# Patient Record
Sex: FEMALE | Race: WHITE | NOT HISPANIC OR LATINO | Employment: FULL TIME | URBAN - METROPOLITAN AREA
[De-identification: names, ages, dates, MRNs, and addresses within clinical notes are randomized per-mention and may not be internally consistent; named-entity substitution may affect disease eponyms.]

---

## 2020-07-03 NOTE — PROGRESS NOTES
Lalitha Barreto is a 49 y.o. female  who presents for a well woman exam.  Notes mild hot flashes and low libido.     Past Medical History:   Diagnosis Date    Migraines        Past Surgical History:   Procedure Laterality Date    ABLATION      BILATERAL TUBAL LIGATION       SECTION      x2    DILATION AND CURETTAGE OF UTERUS      HYSTEROSCOPY WITH DILATION AND CURETTAGE OF UTERUS  2014    with Novasure endometrial ablation    LAPAROSCOPY      TONSILLECTOMY AND ADENOIDECTOMY      WRIST SURGERY Left        OB History    Para Term  AB Living   4 4           SAB TAB Ectopic Multiple Live Births                  # Outcome Date GA Lbr Hussain/2nd Weight Sex Delivery Anes PTL Lv   4 Para            3 Para            2 Para            1 Para                Family History   Problem Relation Age of Onset    Hypertension Father     Hyperlipidemia Father     Hyperlipidemia Mother        Social History     Tobacco Use    Smoking status: Never Smoker    Smokeless tobacco: Never Used   Substance Use Topics    Alcohol use: Not Currently    Drug use: Never         Current Outpatient Medications:     fluticasone propionate (FLONASE) 50 mcg/actuation nasal spray, Flonase Allergy Relief 50 mcg/actuation nasal spray,suspension  Spray 1 spray every day by intranasal route., Disp: , Rfl:     MULTIVITAMIN ORAL, multivitamin  1 po daily, Disp: , Rfl:      Review of patient's allergies indicates:  No Known Allergies     ROS:  GENERAL: Denies weight gain or weight loss. Feeling well overall.   SKIN: Denies rash or lesions.   HEAD: Denies head injury or headache.   NODES: Denies enlarged lymph nodes.   CHEST: Denies shortness of breath.   CARDIOVASCULAR: Denies palpitations or chest pain.   ABDOMEN: Denies abdominal pain, constipation, diarrhea, nausea, vomiting or rectal bleeding.   URINARY: Denies frequency, dysuria, hematuria, or burning on urination.  REPRODUCTIVE: See HPI.   BREASTS: Denies pain,  "lumps, or nipple discharge.   HEMATOLOGIC: Denies easy bruisability or excessive bleeding.  MUSCULOSKELETAL: Denies joint pain or swelling.   NEUROLOGIC: Denies syncope or weakness.   PSYCHIATRIC: Denies depression, anxiety or mood swings.    PHYSICAL EXAM:    /75 (BP Location: Right arm, Patient Position: Sitting, BP Method: Medium (Automatic))   Pulse 68   Ht 5' 3" (1.6 m)   Wt 84.7 kg (186 lb 12.8 oz)   BMI 33.09 kg/m²    Body mass index is 33.09 kg/m².     APPEARANCE: Well nourished, well developed, in no acute distress.  AFFECT: WNL, alert and oriented x 3  SKIN: No acne or hirsutism  NECK: Neck symmetric without masses or thyromegaly  NODES: No inguinal, cervical, axillary, or femoral lymph node enlargement  CHEST: Good respiratory effect  ABDOMEN: Soft.  No tenderness or masses.  No hepatosplenomegaly.  No hernias.  BREASTS: Symmetrical, no skin changes or visible lesions.  No palpable masses, nipple discharge bilaterally.  PELVIC: Normal external genitalia without lesions.  Normal hair distribution.  Adequate perineal body, normal urethral meatus.  Urethra and bladder without tenderness or masses. Vagina moist and well rugated without lesions or discharge.  Cervix pink, without lesions, discharge or tenderness.  No significant cystocele or rectocele.  Bimanual exam shows uterus to be normal size, regular, mobile and nontender.  Adnexa without masses or tenderness.    EXTREMITIES: No edema.     Well woman exam with routine gynecological exam  -     Liquid-based pap smear, screening    Screening mammogram, encounter for  -     Mammo Digital Screening Bilat w/ Allen; Future    Screening for osteoporosis  -     DXA Bone Density Spine And Hip; Future       Referral for colonoscopy screening  Patient was counseled today on A.C.S. Pap guidelines and recommendations for yearly pelvic exams, mammograms and monthly self breast exams; to see her PCP for other health maintenance.   Trial of testosterone cream " from [facility].  Follow up in 1 year

## 2020-07-06 ENCOUNTER — OFFICE VISIT (OUTPATIENT)
Dept: OBSTETRICS AND GYNECOLOGY | Facility: CLINIC | Age: 50
End: 2020-07-06
Payer: COMMERCIAL

## 2020-07-06 VITALS
BODY MASS INDEX: 33.1 KG/M2 | DIASTOLIC BLOOD PRESSURE: 75 MMHG | SYSTOLIC BLOOD PRESSURE: 118 MMHG | HEIGHT: 63 IN | WEIGHT: 186.81 LBS | HEART RATE: 68 BPM

## 2020-07-06 DIAGNOSIS — Z01.419 WELL WOMAN EXAM WITH ROUTINE GYNECOLOGICAL EXAM: Primary | ICD-10-CM

## 2020-07-06 DIAGNOSIS — Z13.820 SCREENING FOR OSTEOPOROSIS: ICD-10-CM

## 2020-07-06 DIAGNOSIS — Z12.31 SCREENING MAMMOGRAM, ENCOUNTER FOR: ICD-10-CM

## 2020-07-06 PROCEDURE — 99396 PR PREVENTIVE VISIT,EST,40-64: ICD-10-PCS | Mod: S$GLB,,, | Performed by: OBSTETRICS & GYNECOLOGY

## 2020-07-06 PROCEDURE — 99396 PREV VISIT EST AGE 40-64: CPT | Mod: S$GLB,,, | Performed by: OBSTETRICS & GYNECOLOGY

## 2020-07-06 RX ORDER — FLUTICASONE PROPIONATE 50 MCG
SPRAY, SUSPENSION (ML) NASAL
COMMUNITY
End: 2022-10-07

## 2020-07-08 ENCOUNTER — TELEPHONE (OUTPATIENT)
Dept: OBSTETRICS AND GYNECOLOGY | Facility: CLINIC | Age: 50
End: 2020-07-08

## 2020-09-21 ENCOUNTER — TELEPHONE (OUTPATIENT)
Dept: OBSTETRICS AND GYNECOLOGY | Facility: CLINIC | Age: 50
End: 2020-09-21

## 2020-09-21 DIAGNOSIS — M85.851 OSTEOPENIA OF BOTH HIPS: Primary | ICD-10-CM

## 2020-09-21 DIAGNOSIS — M85.852 OSTEOPENIA OF BOTH HIPS: Primary | ICD-10-CM

## 2020-09-21 NOTE — TELEPHONE ENCOUNTER
----- Message from Mleissa Sanchez MD sent at 9/21/2020  9:04 AM CDT -----  Please notify pt of osteopenia of the hips but the spine is normal. Please have pt have Vit D3 level checked and take calcium with Vit D BID. Order entered

## 2020-09-21 NOTE — TELEPHONE ENCOUNTER
Returning jaspreet call for appt on friday   Spoke with patient. Two pt identifiers confirmed. Notified patient of results. Patient verbalized understanding.

## 2020-09-21 NOTE — TELEPHONE ENCOUNTER
Spoke with patient. Two pt identifiers confirmed. Notified patient of results. Patient verbalized understanding. Pt will come by and pickup lab orders.

## 2020-09-21 NOTE — TELEPHONE ENCOUNTER
----- Message from Melissa Sanchez MD sent at 9/21/2020  1:39 PM CDT -----  Please notify patient of normal result.

## 2020-09-21 NOTE — PROGRESS NOTES
Please notify pt of osteopenia of the hips but the spine is normal. Please have pt have Vit D3 level checked and take calcium with Vit D BID. Order entered

## 2021-07-01 ENCOUNTER — PATIENT MESSAGE (OUTPATIENT)
Dept: ADMINISTRATIVE | Facility: OTHER | Age: 51
End: 2021-07-01

## 2021-07-07 ENCOUNTER — OFFICE VISIT (OUTPATIENT)
Dept: OBSTETRICS AND GYNECOLOGY | Facility: CLINIC | Age: 51
End: 2021-07-07
Payer: COMMERCIAL

## 2021-07-07 VITALS
DIASTOLIC BLOOD PRESSURE: 81 MMHG | BODY MASS INDEX: 31.07 KG/M2 | SYSTOLIC BLOOD PRESSURE: 127 MMHG | WEIGHT: 175.38 LBS

## 2021-07-07 DIAGNOSIS — Z12.31 SCREENING MAMMOGRAM, ENCOUNTER FOR: ICD-10-CM

## 2021-07-07 DIAGNOSIS — Z01.419 WELL WOMAN EXAM WITH ROUTINE GYNECOLOGICAL EXAM: Primary | ICD-10-CM

## 2021-07-07 PROCEDURE — 3008F PR BODY MASS INDEX (BMI) DOCUMENTED: ICD-10-PCS | Mod: CPTII,S$GLB,, | Performed by: OBSTETRICS & GYNECOLOGY

## 2021-07-07 PROCEDURE — 3008F BODY MASS INDEX DOCD: CPT | Mod: CPTII,S$GLB,, | Performed by: OBSTETRICS & GYNECOLOGY

## 2021-07-07 PROCEDURE — 99396 PR PREVENTIVE VISIT,EST,40-64: ICD-10-PCS | Mod: S$GLB,,, | Performed by: OBSTETRICS & GYNECOLOGY

## 2021-07-07 PROCEDURE — 99396 PREV VISIT EST AGE 40-64: CPT | Mod: S$GLB,,, | Performed by: OBSTETRICS & GYNECOLOGY

## 2021-12-20 ENCOUNTER — TELEPHONE (OUTPATIENT)
Dept: OBSTETRICS AND GYNECOLOGY | Facility: CLINIC | Age: 51
End: 2021-12-20
Payer: COMMERCIAL

## 2021-12-20 DIAGNOSIS — N30.90 CYSTITIS: Primary | ICD-10-CM

## 2021-12-20 RX ORDER — SULFAMETHOXAZOLE AND TRIMETHOPRIM 800; 160 MG/1; MG/1
1 TABLET ORAL 2 TIMES DAILY
Qty: 14 TABLET | Refills: 0 | Status: SHIPPED | OUTPATIENT
Start: 2021-12-20 | End: 2022-10-07

## 2021-12-20 RX ORDER — PHENAZOPYRIDINE HYDROCHLORIDE 200 MG/1
200 TABLET, FILM COATED ORAL 3 TIMES DAILY PRN
Qty: 6 TABLET | Refills: 0 | Status: SHIPPED | OUTPATIENT
Start: 2021-12-20 | End: 2022-10-07

## 2022-07-20 ENCOUNTER — TELEPHONE (OUTPATIENT)
Dept: OBSTETRICS AND GYNECOLOGY | Facility: CLINIC | Age: 52
End: 2022-07-20
Payer: COMMERCIAL

## 2022-07-20 NOTE — TELEPHONE ENCOUNTER
----- Message from Sadia Webb sent at 7/20/2022 10:40 AM CDT -----  Contact: self  Requesting a call back regarding rescheduling her last appointment she had to cancel - system showing next July is first available... Please call back at [phone]

## 2022-10-06 ENCOUNTER — PATIENT MESSAGE (OUTPATIENT)
Dept: OBSTETRICS AND GYNECOLOGY | Facility: CLINIC | Age: 52
End: 2022-10-06
Payer: COMMERCIAL

## 2022-10-07 ENCOUNTER — OFFICE VISIT (OUTPATIENT)
Dept: OBSTETRICS AND GYNECOLOGY | Facility: CLINIC | Age: 52
End: 2022-10-07
Payer: COMMERCIAL

## 2022-10-07 VITALS
BODY MASS INDEX: 32.54 KG/M2 | DIASTOLIC BLOOD PRESSURE: 70 MMHG | HEIGHT: 63 IN | WEIGHT: 183.63 LBS | SYSTOLIC BLOOD PRESSURE: 116 MMHG | HEART RATE: 70 BPM

## 2022-10-07 DIAGNOSIS — Z12.31 SCREENING MAMMOGRAM, ENCOUNTER FOR: ICD-10-CM

## 2022-10-07 DIAGNOSIS — Z13.820 SCREENING FOR OSTEOPOROSIS: ICD-10-CM

## 2022-10-07 DIAGNOSIS — Z01.419 WELL WOMAN EXAM WITH ROUTINE GYNECOLOGICAL EXAM: Primary | ICD-10-CM

## 2022-10-07 PROCEDURE — 99396 PREV VISIT EST AGE 40-64: CPT | Mod: S$GLB,,, | Performed by: OBSTETRICS & GYNECOLOGY

## 2022-10-07 PROCEDURE — 99396 PR PREVENTIVE VISIT,EST,40-64: ICD-10-PCS | Mod: S$GLB,,, | Performed by: OBSTETRICS & GYNECOLOGY

## 2022-10-07 RX ORDER — FLUTICASONE PROPIONATE 50 MCG
SPRAY, SUSPENSION (ML) NASAL
COMMUNITY

## 2022-10-07 NOTE — PROGRESS NOTES
Lalitha Barreto is a 52 y.o. female  who presents for a well woman exam.  She has no issues, problems, or complaints.    Past Medical History:   Diagnosis Date    Colon cancer screening     Cologuard negative    Migraines        Past Surgical History:   Procedure Laterality Date    BILATERAL TUBAL LIGATION       SECTION      x2    DILATION AND CURETTAGE OF UTERUS      HYSTEROSCOPY WITH DILATION AND CURETTAGE OF UTERUS  2014    with Novasure endometrial ablation    LAPAROSCOPY      TONSILLECTOMY AND ADENOIDECTOMY      WRIST SURGERY Left        OB History    Para Term  AB Living   5 4     1 3   SAB IAB Ectopic Multiple Live Births           3      # Outcome Date GA Lbr Hussain/2nd Weight Sex Delivery Anes PTL Lv   5 AB            4 Para            3 Para            2 Para            1 Para                Family History   Problem Relation Age of Onset    Hypertension Father     Hyperlipidemia Father     Hyperlipidemia Mother        Social History     Tobacco Use    Smoking status: Never     Passive exposure: Never    Smokeless tobacco: Never   Substance Use Topics    Alcohol use: Not Currently    Drug use: Never         Current Outpatient Medications:     fluticasone propionate (FLONASE) 50 mcg/actuation nasal spray, Flonase Allergy Relief 50 mcg/actuation nasal spray,suspension  Spray 1 spray every day by intranasal route., Disp: , Rfl:     MULTIVITAMIN ORAL, multivitamin  1 po daily, Disp: , Rfl:      Review of patient's allergies indicates:  No Known Allergies     ROS:  GENERAL: Denies weight gain or weight loss. Feeling well overall.   SKIN: Denies rash or lesions.   HEAD: Denies head injury or headache.   NODES: Denies enlarged lymph nodes.   CHEST: Denies shortness of breath.   CARDIOVASCULAR: Denies palpitations or chest pain.   ABDOMEN: Denies abdominal pain, constipation, diarrhea, nausea, vomiting or rectal bleeding.   URINARY: Denies frequency, dysuria, hematuria, or burning on  "urination.  REPRODUCTIVE: See HPI.   BREASTS: Denies pain, lumps, or nipple discharge.   HEMATOLOGIC: Denies easy bruisability or excessive bleeding.  MUSCULOSKELETAL: Denies joint pain or swelling.   NEUROLOGIC: Denies syncope or weakness.   PSYCHIATRIC: Denies depression, anxiety or mood swings.    PHYSICAL EXAM:    /70 (BP Location: Left arm, Patient Position: Sitting, BP Method: Large (Manual))   Pulse 70   Ht 5' 3" (1.6 m)   Wt 83.3 kg (183 lb 9.6 oz)   BMI 32.52 kg/m²    Body mass index is 32.52 kg/m².     APPEARANCE: Well nourished, well developed, in no acute distress.  AFFECT: WNL, alert and oriented x 3  SKIN: No acne or hirsutism  NECK: Neck symmetric without masses or thyromegaly  NODES: No inguinal, cervical, axillary, or femoral lymph node enlargement  CHEST: Good respiratory effect  ABDOMEN: Soft.  No tenderness or masses.  No hepatosplenomegaly.  No hernias.  BREASTS: Symmetrical, no skin changes or visible lesions.  No palpable masses, nipple discharge bilaterally.  PELVIC: Normal external genitalia without lesions.  Normal hair distribution.  Adequate perineal body, normal urethral meatus.  Urethra and bladder without tenderness or masses. Vagina moist and well rugated without lesions or discharge.  Cervix pink, without lesions, discharge or tenderness.  No significant cystocele or rectocele.  Bimanual exam shows uterus to be normal size, regular, mobile and nontender.  Adnexa without masses or tenderness.    EXTREMITIES: No edema.     Well woman exam with routine gynecological exam  -     Liquid-based pap smear, screening    Screening mammogram, encounter for  -     Mammo Digital Screening Bilat w/ Allen; Future    Screening for osteoporosis  -     DXA Bone Density Spine And Hip; Future       Patient was counseled today on A.C.S. Pap guidelines and recommendations for yearly pelvic exams, mammograms and monthly self breast exams; to see her PCP for other health maintenance.     Follow up " in 1 year

## 2022-10-10 LAB — Lab: NORMAL

## 2023-03-31 ENCOUNTER — PATIENT MESSAGE (OUTPATIENT)
Dept: OBSTETRICS AND GYNECOLOGY | Facility: CLINIC | Age: 53
End: 2023-03-31
Payer: COMMERCIAL

## 2023-04-04 ENCOUNTER — TELEPHONE (OUTPATIENT)
Dept: OBSTETRICS AND GYNECOLOGY | Facility: CLINIC | Age: 53
End: 2023-04-04
Payer: COMMERCIAL

## 2023-04-04 NOTE — TELEPHONE ENCOUNTER
----- Message from Iza Mckeon sent at 4/3/2023  4:42 PM CDT -----  Regarding: appointment  Contact: patient  PT is calling to klaus out if she can get scheduled with this provider, she was a pt of Dr Sanchez and she referred her to you, return call [phone]

## 2023-04-10 ENCOUNTER — OFFICE VISIT (OUTPATIENT)
Dept: OBSTETRICS AND GYNECOLOGY | Facility: CLINIC | Age: 53
End: 2023-04-10
Payer: COMMERCIAL

## 2023-04-10 VITALS
WEIGHT: 184.63 LBS | BODY MASS INDEX: 32.7 KG/M2 | HEART RATE: 67 BPM | SYSTOLIC BLOOD PRESSURE: 116 MMHG | DIASTOLIC BLOOD PRESSURE: 73 MMHG

## 2023-04-10 DIAGNOSIS — N95.1 HOT FLASHES DUE TO MENOPAUSE: ICD-10-CM

## 2023-04-10 DIAGNOSIS — R53.83 FATIGUE, UNSPECIFIED TYPE: ICD-10-CM

## 2023-04-10 DIAGNOSIS — N95.1 VAGINAL DRYNESS, MENOPAUSAL: ICD-10-CM

## 2023-04-10 DIAGNOSIS — Z78.0 MENOPAUSE: Primary | ICD-10-CM

## 2023-04-10 LAB
ABS NRBC COUNT: 0 X 10 3/UL (ref 0–0.01)
ABSOLUTE BASOPHIL: 0.02 X 10 3/UL (ref 0–0.22)
ABSOLUTE EOSINOPHIL: 0.1 X 10 3/UL (ref 0.04–0.54)
ABSOLUTE IMMATURE GRAN: 0.01 X 10 3/UL (ref 0–0.04)
ABSOLUTE LYMPHOCYTE: 2.11 X 10 3/UL (ref 0.86–4.75)
ABSOLUTE MONOCYTE: 0.39 X 10 3/UL (ref 0.22–1.08)
ALBUMIN SERPL-MCNC: 4.6 G/DL (ref 3.5–5.2)
ALBUMIN/GLOB SERPL ELPH: 1.4 {RATIO} (ref 1–2.7)
ALP ISOS SERPL LEV INH-CCNC: 85 U/L (ref 35–105)
ALT (SGPT): 22 U/L (ref 0–33)
AMORPH URATE CRY URNS QL MICRO: ABNORMAL
ANION GAP SERPL CALC-SCNC: 8 MMOL/L (ref 8–17)
AST SERPL-CCNC: 19 U/L (ref 0–32)
BACTERIA #/AREA URNS HPF: ABNORMAL /[HPF]
BASOPHILS NFR BLD: 0.4 % (ref 0.2–1.2)
BILIRUB UR QL STRIP: NEGATIVE
BILIRUBIN, TOTAL: 0.93 MG/DL (ref 0–1.2)
BUN/CREAT SERPL: 15.2 (ref 6–20)
CALCIUM SERPL-MCNC: 9.7 MG/DL (ref 8.6–10.2)
CARBON DIOXIDE, CO2: 29 MMOL/L (ref 22–29)
CHLORIDE: 105 MMOL/L (ref 98–107)
CHOLEST SERPL-MSCNC: 263 MG/DL (ref 100–200)
CLARITY UR: CLEAR
COLOR UR: YELLOW
CREAT SERPL-MCNC: 0.71 MG/DL (ref 0.5–0.9)
E2: <12.2 PG/ML
EOSINOPHIL NFR BLD: 2 % (ref 0.7–7)
EPITHELIAL CELLS: ABNORMAL
FREE TESTOSTERONE: 0.38 NG/DL (ref 0–1)
FSH: 80.2 MIU/ML
GFR ESTIMATION: 101.61
GLOBULIN: 3.2 G/DL (ref 1.5–4.5)
GLUCOSE (UA): NEGATIVE MG/DL
GLUCOSE: 103 MG/DL (ref 74–106)
HCT VFR BLD AUTO: 45.2 % (ref 37–47)
HDLC SERPL-MCNC: 72 MG/DL
HGB BLD-MCNC: 14.5 G/DL (ref 12–16)
IMMATURE GRANULOCYTES: 0.2 % (ref 0–0.5)
INSULIN AB SER QL: 9.23 UIU/ML (ref 2.6–24.9)
KETONES UR QL STRIP: NEGATIVE MG/DL
LDL/HDL RATIO: 2.2 (ref 1–3)
LDLC SERPL CALC-MCNC: 161.2 MG/DL (ref 0–100)
LEUKOCYTE ESTERASE UR QL STRIP: ABNORMAL
LH: 35.2 MIU/ML
LYMPHOCYTES NFR BLD: 42.2 % (ref 19.3–53.1)
MCH RBC QN AUTO: 28.7 PG (ref 27–32)
MCHC RBC AUTO-ENTMCNC: 32.1 G/DL (ref 32–36)
MCV RBC AUTO: 89.5 FL (ref 82–100)
MONOCYTES NFR BLD: 7.8 % (ref 4.7–12.5)
MUCOUS THREADS URNS QL MICRO: NEGATIVE
NEUTROPHILS # BLD AUTO: 2.37 X 10 3/UL (ref 2.15–7.56)
NEUTROPHILS NFR BLD: 47.4 % (ref 34–71.1)
NITRITE UR QL STRIP: NEGATIVE
NUCLEATED RED BLOOD CELLS: 0 /100 WBC (ref 0–0.2)
OCCULT BLOOD: NEGATIVE
PH, URINE: 8 (ref 5–7.5)
PLATELET # BLD AUTO: 207 X 10 3/UL (ref 135–400)
POTASSIUM: 4.4 MMOL/L (ref 3.5–5.1)
PROT SNV-MCNC: 7.8 G/DL (ref 6.4–8.3)
PROT UR QL STRIP: NEGATIVE MG/DL
RBC # BLD AUTO: 5.05 X 10 6/UL (ref 4.2–5.4)
RBC/HPF: NEGATIVE
RDW-SD: 46.1 FL (ref 37–54)
SODIUM: 142 MMOL/L (ref 136–145)
SP GR UR STRIP: 1.01 (ref 1–1.03)
T4, FREE: 1.28 NG/DL (ref 0.93–1.7)
TESTOST SERPL-MCNC: 31.5 NG/DL (ref 2.9–40.8)
TRIGL SERPL-MCNC: 149 MG/DL (ref 0–150)
TSH SERPL DL<=0.005 MIU/L-ACNC: 1.23 UIU/ML (ref 0.27–4.2)
UREA NITROGEN (BUN): 10.8 MG/DL (ref 6–20)
UROBILINOGEN, URINE: NORMAL E.U./DL (ref 0–1)
VITAMIN D (25OHD): 27.1 NG/ML
WBC # BLD: 5 X 10 3/UL (ref 4.3–10.8)
WBC/HPF: ABNORMAL

## 2023-04-10 PROCEDURE — 99213 PR OFFICE/OUTPT VISIT, EST, LEVL III, 20-29 MIN: ICD-10-PCS | Mod: S$GLB,,,

## 2023-04-10 PROCEDURE — 99213 OFFICE O/P EST LOW 20 MIN: CPT | Mod: S$GLB,,,

## 2023-04-10 RX ORDER — FEXOFENADINE HYDROCHLORIDE AND PSEUDOEPHEDRINE HYDROCHLORIDE 60; 120 MG/1; MG/1
1 TABLET, FILM COATED, EXTENDED RELEASE ORAL 2 TIMES DAILY PRN
COMMUNITY
Start: 2022-12-14

## 2023-04-10 RX ORDER — ESTRADIOL 0.05 MG/D
1 FILM, EXTENDED RELEASE TRANSDERMAL
Qty: 8 PATCH | Refills: 11 | Status: SHIPPED | OUTPATIENT
Start: 2023-04-10 | End: 2024-04-02

## 2023-04-10 RX ORDER — ESTRADIOL 10 UG/1
1 INSERT VAGINAL NIGHTLY
Qty: 8 EACH | Refills: 11 | Status: SHIPPED | OUTPATIENT
Start: 2023-04-10 | End: 2024-04-02 | Stop reason: SDUPTHER

## 2023-04-10 RX ORDER — PROGESTERONE 200 MG/1
200 CAPSULE ORAL NIGHTLY
Qty: 30 CAPSULE | Refills: 11 | Status: SHIPPED | OUTPATIENT
Start: 2023-04-10 | End: 2024-04-09

## 2023-04-10 NOTE — PROGRESS NOTES
Subjective:      Patient ID: Lalitha Barreto is a 52 y.o. female.    Chief Complaint:  Hormone consult and Hot Flashes      History of Present Illness  Hormone Replacement Counseling  Patient presents to discuss hormone replacement therapy. Patient is requesting hormone replacement therapy due to hot flashes, fatigue, vaginal dryness. The patient is not taking hormone replacement therapy. Patient denies post-menopausal vaginal bleeding. The patient is sexually active. She reports dyspareunia. GYN screening history: last pap: was normal. Patient is hormone deficient due to menopause, which occurred in her late 40s The patient currently has symptoms of hot flashes, fatigue, & vaginal dryness, making intercourse painful. She tried a hormone cream in the past but didn't recall what this was. She reports hot flashes and full body sweats especially at night. She has felt more recently fatigued, she exercises regularly and still reports weight gain. She has had an ablation and has done well with this. She denies problems with her bowels or bladder. No pelvic pain.    Vitals:    04/10/23 1043   BP: 116/73   Pulse: 67     Outpatient Medications Marked as Taking for the 4/10/23 encounter (Office Visit) with Yamila Warren NP   Medication Sig Dispense Refill    ALLEGRA-D 12 HOUR  mg per tablet Take 1 tablet by mouth 2 (two) times daily as needed.      fluticasone propionate (FLONASE) 50 mcg/actuation nasal spray Flonase Allergy Relief 50 mcg/actuation nasal spray,suspension   Spray 1 spray every day by intranasal route.      MULTIVITAMIN ORAL multivitamin   1 po daily       Past Medical History:   Diagnosis Date    Colon cancer screening     Cologuard negative    Migraines      Past Surgical History:   Procedure Laterality Date    BILATERAL TUBAL LIGATION       SECTION      x2    DILATION AND CURETTAGE OF UTERUS      HYSTEROSCOPY WITH DILATION AND CURETTAGE OF UTERUS  2014    with Novasure endometrial  ablation    LAPAROSCOPY      TONSILLECTOMY AND ADENOIDECTOMY      WRIST SURGERY Left            GYN & OB History  No LMP recorded (lmp unknown). Patient has had an ablation.   Date of Last Pap: No result found    OB History    Para Term  AB Living   5 4     1 3   SAB IAB Ectopic Multiple Live Births           3      # Outcome Date GA Lbr Hussain/2nd Weight Sex Delivery Anes PTL Lv   5 AB            4 Para            3 Para            2 Para            1 Para                Review of Systems  Review of Systems   Constitutional:  Negative for activity change.   Eyes:  Negative for visual disturbance.   Respiratory:  Negative for shortness of breath.    Cardiovascular:  Negative for chest pain.   Gastrointestinal:  Negative for abdominal pain.   Endocrine: Positive for hot flashes.   Genitourinary:  Positive for dyspareunia and vaginal dryness. Negative for vaginal bleeding.        No abnormal vaginal bleeding   Musculoskeletal:  Negative for back pain.   Integumentary:  Negative for rash and breast mass.   Neurological:  Negative for numbness.   Psychiatric/Behavioral:          No mood disturbance or changes    Breast: Negative for mass       Objective:     Physical Exam No exam today     Assessment:     1. Menopause    2. Hot flashes due to menopause    3. Fatigue, unspecified type    4. Vaginal dryness, menopausal              Plan:     Health panel + hormone labs ordered  Begin vivelle dot patch, prometrium, imvexxy  Follow up in 3 months for a med check  Annual wellness due in October  If you don't hear from the office regarding results within 1 week, please call

## 2023-04-11 NOTE — PROGRESS NOTES
Called & spoke with pt, discussed lab results. Suggested vitamin D supplementation and beginning crestor 10mg for high cholesterol. She would like to try diet & exercise first. Explained her hormones suggest she is postmenopausal, she agrees to begin HRT. We will f/u in 3 months.

## 2024-03-31 PROBLEM — J30.2 SEASONAL ALLERGIC RHINITIS: Status: ACTIVE | Noted: 2018-11-28

## 2024-03-31 PROBLEM — E66.9 OBESITY: Status: ACTIVE | Noted: 2021-08-20

## 2024-03-31 PROBLEM — F41.9 ANXIETY: Status: ACTIVE | Noted: 2018-11-28

## 2024-03-31 PROBLEM — Z87.442 HISTORY OF RENAL CALCULI: Status: ACTIVE | Noted: 2024-03-31

## 2024-03-31 PROBLEM — J45.909 ASTHMA: Status: ACTIVE | Noted: 2018-11-28

## 2024-04-01 NOTE — PROGRESS NOTES
CC:  WELL WOMAN (menopausal since  2019? Not sure bc ablation )  Patient Care Team:  Lopez Rose MD (Inactive) as PCP - General (Internal Medicine)  Leah Londono, NP (Chiropractic Medicine)    NEW PATIENT       HPI:  Patient is a 53 y.o. who presents for her well woman exam today.  History reviewed with patient. Just on the prometrium for sleep from her NP. Needs rf on her imvexxy though bc she is symptomatic again since she hasn't used it in a while  Patient is without complaints or concerns today.     HRT: progesterone only  and HRT: past use of combination hrt  HX ABNL PAPS: none    REVIEW OF PRIOR DATA/ HEALTH MAINTENANCE:  LAST ANNUAL:   2022    LAST MMG (screening)- 2023-  [facility]   LAST LABS- does with PCP    LAST COLOGARD-  - neg- Q 3 years   LAST DEXA- - osteopenia at [facility]     Past Medical History:   Diagnosis Date    Colon cancer screening     Cologuard negative    Endometriosis of uterus     Laparoscopic surgery     Migraines      SURGICAL HX:   has a past surgical history that includes Dilation and curettage of uterus; Tonsillectomy and adenoidectomy; Laparoscopy;  section; Bilateral tubal ligation; Hysteroscopy with dilation and curettage of uterus (2014); Wrist surgery (Left); Endometrial ablation; and Tubal ligation ().    SOCIAL HX:    reports that she has never smoked. She has never been exposed to tobacco smoke. She has never used smokeless tobacco. She reports that she does not currently use alcohol. She reports that she does not use drugs.    Outpatient Medications Prior to Visit   Medication Sig Dispense Refill    ALLEGRA-D 12 HOUR  mg per tablet Take 1 tablet by mouth 2 (two) times daily as needed.      fluticasone propionate (FLONASE) 50 mcg/actuation nasal spray Flonase Allergy Relief 50 mcg/actuation nasal spray,suspension   Spray 1 spray every day by intranasal route.      MULTIVITAMIN ORAL multivitamin   1  po daily      NON FORMULARY MEDICATION Take 1 capsule by mouth once daily. Thyroid 65mg capsule from [facility]      progesterone (PROMETRIUM) 200 MG capsule Take 1 capsule (200 mg total) by mouth nightly. 30 capsule 11    IMVEXXY MAINTENANCE PACK 10 mcg Inst Place 1 suppository vaginally nightly. 8 each 11    albuterol sulfate (PROAIR DIGIHALER) 90 mcg/actuation aebs INHALE TWO PUFFS BY MOUTH FOUR TIMES DAILY FOR 7 DAYS      amoxicillin-clavulanate 875-125mg (AUGMENTIN) 875-125 mg per tablet TAKE 1 TABLET BY MOUTH EVERY 12 HOURS FOR 10 DAYS      fluconazole (DIFLUCAN) 150 MG Tab TAKE 1 TABLET BY MOUTH AS DIRECTED FOR ONE day      predniSONE (DELTASONE) 20 MG tablet TAKE 2 TABLETS BY MOUTH EVERY DAY WITH FOOD FOR 5 DAYS      scopolamine (TRANSDERM-SCOP) 1.3-1.5 mg (1 mg over 3 days) apply one patch behind the ear EVERY 3 days IF NEEDED      estradiol 0.05 mg/24 hr td ptsw (VIVELLE-DOT) 0.05 mg/24 hr Place 1 patch onto the skin twice a week. (Patient not taking: Reported on 4/2/2024) 8 patch 11     No facility-administered medications prior to visit.      VITALS:  Blood pressure 120/84, pulse 62, weight 82.7 kg (182 lb 6.4 oz).  Body mass index is 32.31 kg/m².     PHYSICAL EXAM-  APPEARANCE: Well appearing, in no acute distress.   CV/PULM: No resp distress, normal resp effort   PSYCH:  Normal mood and affect, cooperative   SKIN: No rashes, lesions, or abnormal bruising   ABD: Soft, without tenderness or masses.   BREAST: deferred/declined  PELVIC:  VULVA: Normal female genitalia. No lesions.   URETHRAL MEATUS: No masses, no significant prolapse.   BLADDER/ URETHRA: No masses or suprapubic tenderness   VAGINA/ CVX: No lesions. +atrophic changes. No discharge   UTERUS:  mobile, non-tender   ADNEXA: No masses, tenderness, or fullness   ANUS/ PERINEUM: Normal tone.  No lesions.           *patient verbally consented for exam and female chaperone present for entire exam     ASSESSMENT and PLAN:  Encounter for well woman  exam with routine gynecological exam  -     Liquid-based pap smear, screening    Breast cancer screening by mammogram  -     Mammo Digital Screening Bilat w/ Allen; Future; Expected date: 04/14/2024    Screening for colon cancer  -     Cologuard Screening (Multitarget Stool DNA); Future; Expected date: 04/15/2024    Menopausal state    Hormone replacement therapy (HRT)    Menopause  -     IMVEXXY MAINTENANCE PACK 10 mcg Inst; Place 1 suppository vaginally nightly.  Dispense: 24 each; Refill: 4    Vaginal dryness, menopausal  -     IMVEXXY MAINTENANCE PACK 10 mcg Inst; Place 1 suppository vaginally nightly.  Dispense: 24 each; Refill: 4       FOLLOWUP:  1 year for wwe or sooner prn    COUNSELING:  Patient was counseled today on recommendation for yearly pelvic exam, current Pap guidelines, self breast exams, annual screening mammograms, routine screening colonoscopy , and bone density testing.  Discussed vitamin D and calcium supplements as well as weight bearing exercise to decrease risks. Encouraged patient to see her PCP for other health maintenance.

## 2024-04-02 ENCOUNTER — OFFICE VISIT (OUTPATIENT)
Dept: OBSTETRICS AND GYNECOLOGY | Facility: CLINIC | Age: 54
End: 2024-04-02
Payer: COMMERCIAL

## 2024-04-02 VITALS
SYSTOLIC BLOOD PRESSURE: 120 MMHG | BODY MASS INDEX: 32.31 KG/M2 | DIASTOLIC BLOOD PRESSURE: 84 MMHG | HEART RATE: 62 BPM | WEIGHT: 182.38 LBS

## 2024-04-02 DIAGNOSIS — Z01.419 ENCOUNTER FOR WELL WOMAN EXAM WITH ROUTINE GYNECOLOGICAL EXAM: Primary | ICD-10-CM

## 2024-04-02 DIAGNOSIS — N95.1 MENOPAUSAL STATE: ICD-10-CM

## 2024-04-02 DIAGNOSIS — Z12.11 SCREENING FOR COLON CANCER: ICD-10-CM

## 2024-04-02 DIAGNOSIS — N95.1 VAGINAL DRYNESS, MENOPAUSAL: ICD-10-CM

## 2024-04-02 DIAGNOSIS — Z12.31 BREAST CANCER SCREENING BY MAMMOGRAM: ICD-10-CM

## 2024-04-02 DIAGNOSIS — Z78.0 MENOPAUSE: ICD-10-CM

## 2024-04-02 DIAGNOSIS — Z79.890 HORMONE REPLACEMENT THERAPY (HRT): ICD-10-CM

## 2024-04-02 PROCEDURE — 3074F SYST BP LT 130 MM HG: CPT | Mod: CPTII,S$GLB,, | Performed by: OBSTETRICS & GYNECOLOGY

## 2024-04-02 PROCEDURE — 99459 PELVIC EXAMINATION: CPT | Mod: S$GLB,,, | Performed by: OBSTETRICS & GYNECOLOGY

## 2024-04-02 PROCEDURE — 99396 PREV VISIT EST AGE 40-64: CPT | Mod: S$GLB,,, | Performed by: OBSTETRICS & GYNECOLOGY

## 2024-04-02 PROCEDURE — 1159F MED LIST DOCD IN RCRD: CPT | Mod: CPTII,S$GLB,, | Performed by: OBSTETRICS & GYNECOLOGY

## 2024-04-02 PROCEDURE — 3079F DIAST BP 80-89 MM HG: CPT | Mod: CPTII,S$GLB,, | Performed by: OBSTETRICS & GYNECOLOGY

## 2024-04-02 PROCEDURE — 3008F BODY MASS INDEX DOCD: CPT | Mod: CPTII,S$GLB,, | Performed by: OBSTETRICS & GYNECOLOGY

## 2024-04-02 RX ORDER — SCOLOPAMINE TRANSDERMAL SYSTEM 1 MG/1
PATCH, EXTENDED RELEASE TRANSDERMAL
COMMUNITY

## 2024-04-02 RX ORDER — ALBUTEROL SULFATE 90 UG/1
INHALANT RESPIRATORY (INHALATION)
COMMUNITY

## 2024-04-02 RX ORDER — AMOXICILLIN AND CLAVULANATE POTASSIUM 875; 125 MG/1; MG/1
TABLET, FILM COATED ORAL
COMMUNITY

## 2024-04-02 RX ORDER — PREDNISONE 20 MG/1
TABLET ORAL
COMMUNITY

## 2024-04-02 RX ORDER — ESTRADIOL 10 UG/1
1 INSERT VAGINAL NIGHTLY
Qty: 24 EACH | Refills: 4 | Status: SHIPPED | OUTPATIENT
Start: 2024-04-02

## 2024-04-02 RX ORDER — FLUCONAZOLE 150 MG/1
TABLET ORAL
COMMUNITY

## 2024-04-03 LAB — Lab: NORMAL

## 2024-05-30 ENCOUNTER — DOCUMENTATION ONLY (OUTPATIENT)
Dept: OBSTETRICS AND GYNECOLOGY | Facility: CLINIC | Age: 54
End: 2024-05-30
Payer: COMMERCIAL

## 2025-04-09 PROBLEM — J45.909 ASTHMA: Status: ACTIVE | Noted: 2018-11-27

## 2025-04-09 PROBLEM — J30.2 SEASONAL ALLERGIC RHINITIS: Status: RESOLVED | Noted: 2018-11-28 | Resolved: 2025-04-09

## 2025-04-10 ENCOUNTER — OFFICE VISIT (OUTPATIENT)
Dept: OBSTETRICS AND GYNECOLOGY | Facility: CLINIC | Age: 55
End: 2025-04-10
Payer: COMMERCIAL

## 2025-04-10 VITALS — BODY MASS INDEX: 33.49 KG/M2 | WEIGHT: 189 LBS | HEIGHT: 63 IN

## 2025-04-10 DIAGNOSIS — Z01.419 ENCOUNTER FOR WELL WOMAN EXAM WITH ROUTINE GYNECOLOGICAL EXAM: Primary | ICD-10-CM

## 2025-04-10 DIAGNOSIS — Z12.31 BREAST CANCER SCREENING BY MAMMOGRAM: ICD-10-CM

## 2025-04-10 PROCEDURE — 99396 PREV VISIT EST AGE 40-64: CPT | Mod: S$PBB,,, | Performed by: OBSTETRICS & GYNECOLOGY

## 2025-04-10 PROCEDURE — 1159F MED LIST DOCD IN RCRD: CPT | Mod: CPTII,,, | Performed by: OBSTETRICS & GYNECOLOGY

## 2025-04-10 PROCEDURE — 3008F BODY MASS INDEX DOCD: CPT | Mod: CPTII,,, | Performed by: OBSTETRICS & GYNECOLOGY

## 2025-04-10 RX ORDER — SULFAMETHOXAZOLE AND TRIMETHOPRIM 800; 160 MG/1; MG/1
1 TABLET ORAL 2 TIMES DAILY
COMMUNITY
Start: 2025-03-31 | End: 2025-04-10

## 2025-04-10 RX ORDER — OMEPRAZOLE 20 MG/1
20 CAPSULE, DELAYED RELEASE ORAL
COMMUNITY
Start: 2025-03-31

## 2025-04-10 NOTE — PROGRESS NOTES
"CC:  WELL WOMAN (menopausal since unknown-ablation)  Patient Care Team:  Lopez Roes MD (Inactive) as PCP - General (Internal Medicine)  Leah Londono NP (Chiropractic Medicine)        HPI:  Patient is a 54 y.o. who presents for her well woman exam today.  History reviewed with patient. Pt on pellets but pt doesnot know what is in her pellets (test only, est +test?). Pt not on any progesterone and says she asked them if she needed to take anything else and they told her "no- this is complete and has everything u need in the pellet". Discussed thoroughly that there is no progesterone in pellets that I am aware of but that if it contained estrogen, then she is taking a potentially high dose of unopposed estrogen which can cause endometrial cancer. Pt will try to find out what hormones are in her pellet and also signed rr for us to try and get information as well.  Patient is without complaints or concerns today.     progesterone hrt  HX ABNL PAPS: none    REVIEW OF PRIOR DATA/ HEALTH MAINTENANCE:  LAST ANNUAL:   2024    LAST MMG- 2024-  [facility]    LAST COLOGARD-  - neg- Q 3 years   LAST DEXA- - osteopenia at [facility]     Past Medical History:   Diagnosis Date    Colon cancer screening     Cologuard negative    Endometriosis of uterus     Laparoscopic surgery     Migraines      SURGICAL HX:   has a past surgical history that includes Dilation and curettage of uterus; Tonsillectomy and adenoidectomy; Laparoscopy;  section; Bilateral tubal ligation; Hysteroscopy with dilation and curettage of uterus (2014); Wrist surgery (Left); Endometrial ablation; and Tubal ligation ().    SOCIAL HX:    reports that she has never smoked. She has never been exposed to tobacco smoke. She has never used smokeless tobacco. She reports that she does not currently use alcohol. She reports that she does not use drugs.    Current Outpatient Medications   Medication " "Instructions    albuterol sulfate (PROAIR DIGIHALER) 90 mcg/actuation aebs INHALE TWO PUFFS BY MOUTH FOUR TIMES DAILY FOR 7 DAYS    fluticasone propionate (FLONASE) 50 mcg/actuation nasal spray Flonase Allergy Relief 50 mcg/actuation nasal spray,suspension   Spray 1 spray every day by intranasal route.    MULTIVITAMIN ORAL multivitamin   1 po daily    NON FORMULARY MEDICATION 1 capsule, Daily    omeprazole (PRILOSEC) 20 mg     VITALS:  Height 5' 3" (1.6 m), weight 85.7 kg (189 lb).  Body mass index is 33.48 kg/m².     PHYSICAL EXAM-  APPEARANCE: Well appearing, in no acute distress.   CV/PULM: No resp distress, normal resp effort   PSYCH:  Normal mood and affect, cooperative   SKIN: No rashes, lesions, or abnormal bruising   ABD: Soft, without tenderness or masses.   BREAST: deferred/declined  PELVIC:  VULVA: Normal female genitalia. No lesions.   URETHRAL MEATUS: No masses, no significant prolapse.   BLADDER/ URETHRA: No masses or suprapubic tenderness   VAGINA/ CVX: No lesions. +atrophic changes. No discharge   UTERUS:  mobile, non-tender   ADNEXA: No masses, tenderness, or fullness   ANUS/ PERINEUM: Normal tone.  No lesions.           *patient verbally consented for exam and female chaperone present for entire exam     ASSESSMENT and PLAN:  Encounter for well woman exam with routine gynecological exam  -     Liquid-based pap smear, screening    Breast cancer screening by mammogram  -     Mammo Digital Screening Bilat w/ Allen ([facility]); Future; Expected date: 04/23/2025     Possibly on unopposed estrogen with her pellets- rr signed and faxed and pt to call. If estrogen is in her pellet, will need u/s to eval endometrium and will need to get on progesterone as well    FOLLOWUP:  1 year for wwe or sooner prn    COUNSELING:  Patient was counseled today on recommendation for yearly pelvic exam, current Pap guidelines, self breast exams, annual screening mammograms, routine screening colonoscopy , and bone density testing. "  Discussed vitamin D and calcium supplements as well as weight bearing exercise to decrease risks. Encouraged patient to see her PCP for other health maintenance.

## 2025-04-16 ENCOUNTER — RESULTS FOLLOW-UP (OUTPATIENT)
Dept: OBSTETRICS AND GYNECOLOGY | Facility: CLINIC | Age: 55
End: 2025-04-16

## 2025-07-22 ENCOUNTER — TELEPHONE (OUTPATIENT)
Dept: OBSTETRICS AND GYNECOLOGY | Facility: CLINIC | Age: 55
End: 2025-07-22
Payer: COMMERCIAL

## 2025-07-31 ENCOUNTER — TELEPHONE (OUTPATIENT)
Dept: OBSTETRICS AND GYNECOLOGY | Facility: CLINIC | Age: 55
End: 2025-07-31
Payer: COMMERCIAL

## 2025-07-31 NOTE — TELEPHONE ENCOUNTER
Attempted to contact patient, no answer. Left patient voice mail to return call to clinic to discuss her additional breast imaging results. A result note was also sent to patient through the portal.

## 2025-07-31 NOTE — TELEPHONE ENCOUNTER
"----- Message from Terrie Ferrer MD sent at 7/31/2025  3:23 PM CDT -----  Please let pt know that the extra views and ultrasound show what they think are "probably benign" cysts and recommend u/s in 6 months to recheck. She can do that or if she would like a rf to dr garcia we can do that as well  ----- Message -----  From: Paulette Aguilar MA  Sent: 7/31/2025   1:33 PM CDT  To: Terrie Ferrer MD    "